# Patient Record
Sex: MALE | Race: WHITE | Employment: UNEMPLOYED | URBAN - METROPOLITAN AREA
[De-identification: names, ages, dates, MRNs, and addresses within clinical notes are randomized per-mention and may not be internally consistent; named-entity substitution may affect disease eponyms.]

---

## 2017-07-19 PROBLEM — K40.90 RIGHT INGUINAL HERNIA: Status: ACTIVE | Noted: 2017-07-19

## 2017-08-30 PROBLEM — T81.41XA POSTOPERATIVE STITCH ABSCESS: Status: ACTIVE | Noted: 2017-08-30

## 2019-10-12 RX ORDER — ALPRAZOLAM 0.5 MG/1
0.5 TABLET ORAL 3 TIMES DAILY PRN
COMMUNITY

## 2019-10-12 RX ORDER — DULOXETIN HYDROCHLORIDE 30 MG/1
30 CAPSULE, DELAYED RELEASE ORAL DAILY
COMMUNITY

## 2020-12-11 ENCOUNTER — HOSPITAL ENCOUNTER (EMERGENCY)
Age: 45
Discharge: HOME | End: 2020-12-11

## 2020-12-11 ENCOUNTER — APPOINTMENT (OUTPATIENT)
Dept: GENERAL RADIOLOGY | Age: 45
End: 2020-12-11

## 2020-12-11 VITALS
SYSTOLIC BLOOD PRESSURE: 138 MMHG | OXYGEN SATURATION: 95 % | HEART RATE: 97 BPM | RESPIRATION RATE: 18 BRPM | TEMPERATURE: 98.1 F | DIASTOLIC BLOOD PRESSURE: 83 MMHG

## 2020-12-11 PROCEDURE — 6370000000 HC RX 637 (ALT 250 FOR IP): Performed by: PHYSICIAN ASSISTANT

## 2020-12-11 PROCEDURE — 73130 X-RAY EXAM OF HAND: CPT

## 2020-12-11 PROCEDURE — 12002 RPR S/N/AX/GEN/TRNK2.6-7.5CM: CPT

## 2020-12-11 PROCEDURE — 6360000002 HC RX W HCPCS: Performed by: PHYSICIAN ASSISTANT

## 2020-12-11 PROCEDURE — 99283 EMERGENCY DEPT VISIT LOW MDM: CPT

## 2020-12-11 PROCEDURE — 90715 TDAP VACCINE 7 YRS/> IM: CPT | Performed by: PHYSICIAN ASSISTANT

## 2020-12-11 PROCEDURE — 2500000003 HC RX 250 WO HCPCS: Performed by: PHYSICIAN ASSISTANT

## 2020-12-11 RX ORDER — HYDROCODONE BITARTRATE AND ACETAMINOPHEN 5; 325 MG/1; MG/1
1 TABLET ORAL EVERY 6 HOURS PRN
Qty: 20 TABLET | Refills: 0 | Status: SHIPPED | OUTPATIENT
Start: 2020-12-11 | End: 2020-12-16

## 2020-12-11 RX ORDER — CEPHALEXIN 500 MG/1
500 CAPSULE ORAL 4 TIMES DAILY
Qty: 40 CAPSULE | Refills: 0 | Status: SHIPPED | OUTPATIENT
Start: 2020-12-11 | End: 2020-12-21

## 2020-12-11 RX ORDER — LIDOCAINE HYDROCHLORIDE 10 MG/ML
10 INJECTION, SOLUTION EPIDURAL; INFILTRATION; INTRACAUDAL; PERINEURAL ONCE
Status: COMPLETED | OUTPATIENT
Start: 2020-12-11 | End: 2020-12-11

## 2020-12-11 RX ORDER — CEPHALEXIN 250 MG/1
500 CAPSULE ORAL ONCE
Status: COMPLETED | OUTPATIENT
Start: 2020-12-11 | End: 2020-12-11

## 2020-12-11 RX ADMIN — CEPHALEXIN 500 MG: 250 CAPSULE ORAL at 20:56

## 2020-12-11 RX ADMIN — LIDOCAINE HYDROCHLORIDE 10 ML: 10 INJECTION, SOLUTION EPIDURAL; INFILTRATION; INTRACAUDAL; PERINEURAL at 20:57

## 2020-12-11 RX ADMIN — TETANUS TOXOID, REDUCED DIPHTHERIA TOXOID AND ACELLULAR PERTUSSIS VACCINE, ADSORBED 0.5 ML: 5; 2.5; 8; 8; 2.5 SUSPENSION INTRAMUSCULAR at 20:57

## 2020-12-11 ASSESSMENT — PAIN SCALES - GENERAL
PAINLEVEL_OUTOF10: 7
PAINLEVEL_OUTOF10: 5

## 2020-12-11 ASSESSMENT — PAIN DESCRIPTION - ORIENTATION: ORIENTATION: LEFT

## 2020-12-11 ASSESSMENT — PAIN DESCRIPTION - LOCATION: LOCATION: HAND

## 2020-12-11 NOTE — ED PROVIDER NOTES
eMERGENCY dEPARTMENT eNCOUnter        [facility]    Chief Complaint   Patient presents with    Hand Injury     cut left 5th finger with chainsaw       HPI    Trudy Lomeli is a 50 y.o. male who presents with a laceration. Onset was prior to arrival.  Context was patient accidentally cut his left 5th finger on a chainsaw. Laceration is localized to the lateral aspect of the proximal phalanx. Patient reports associated tenderness to the site, severity 5/10, as well as some numbness to the lateral distal phalanx. Denies any limitation to ROM of the finger. Patient is not up-to-date on Tetanus. REVIEW OF SYSTEMS    See HPI for further details. Review of systems otherwise negative. Musculoskeletal:  + finger pain  Integument:  + laceration  Neurologic:  + numbness distal finger    PAST MEDICAL HISTORY    Past Medical History:   Diagnosis Date    Chronic back pain     HERNIATED L-4 & L-5       SURGICAL HISTORY    Past Surgical History:   Procedure Laterality Date    ELBOW SURGERY Right     SCOPE    HERNIA REPAIR      KNEE SURGERY      MANISCUS        CURRENT MEDICATIONS    Current Outpatient Rx   Medication Sig Dispense Refill    DULoxetine (CYMBALTA) 30 MG extended release capsule Take 30 mg by mouth daily      ALPRAZolam (XANAX) 0.5 MG tablet Take 0.5 mg by mouth 3 times daily as needed for Sleep.       sulfamethoxazole-trimethoprim (BACTRIM DS;SEPTRA DS) 800-160 MG per tablet TAKE 1 TABLET BY MOUTH TWICE A DAY  0    ibuprofen (ADVIL;MOTRIN) 200 MG tablet Take 200 mg by mouth every 8 hours as needed for Pain (ONLY AS NEEDED)         ALLERGIES    Allergies   Allergen Reactions    Naproxen Sodium Anaphylaxis    Peanut-Containing Drug Products Anaphylaxis       FAMILY HISTORY    Family History   Problem Relation Age of Onset    High Blood Pressure Mother     High Blood Pressure Father     Prostate Cancer Father        SOCIAL HISTORY    Social History     Socioeconomic History    Marital status:      Spouse name: None    Number of children: None    Years of education: None    Highest education level: None   Occupational History    None   Social Needs    Financial resource strain: None    Food insecurity     Worry: None     Inability: None    Transportation needs     Medical: None     Non-medical: None   Tobacco Use    Smoking status: Never Smoker    Smokeless tobacco: Never Used   Substance and Sexual Activity    Alcohol use: Yes     Comment: occassional    Drug use: No    Sexual activity: None   Lifestyle    Physical activity     Days per week: None     Minutes per session: None    Stress: None   Relationships    Social connections     Talks on phone: None     Gets together: None     Attends Buddhism service: None     Active member of club or organization: None     Attends meetings of clubs or organizations: None     Relationship status: None    Intimate partner violence     Fear of current or ex partner: None     Emotionally abused: None     Physically abused: None     Forced sexual activity: None   Other Topics Concern    None   Social History Narrative    None       PHYSICAL EXAM    VITAL SIGNS: /83   Pulse 97   Temp 98.1 °F (36.7 °C) (Oral)   Resp 18   SpO2 95%   Constitutional:  Well developed, well nourished, no acute distress, non-toxic appearance   HENT:  NC/AT. Ears, nose, mouth normal.   Respiratory:  Normal respiratory effort. Musculoskeletal:  No edema, no tenderness, no deformities. Integument:  Approximately 3 cm total length jagged laceration along lateral proximal phalanx of left 5th finger      RADIOLOGY/PROCEDURES    Xr Hand Left (min 3 Views)    Result Date: 12/11/2020  EXAMINATION: THREE XRAY VIEWS OF THE LEFT HAND 12/11/2020 7:18 pm COMPARISON: None.  HISTORY: ORDERING SYSTEM PROVIDED HISTORY: cut 5th finger on chainsaw TECHNOLOGIST PROVIDED HISTORY: Reason for exam:->cut 5th finger on chainsaw Reason for Exam: cut 5th finger on chainsaw Acuity: Acute Type of Exam: Initial FINDINGS: Soft tissue injury overlying 5th proximal phalanx. Fracture of the medial cortex of the 5th proximal phalanx. Anatomic alignment. No other bony abnormality. Fracture of the cortex of the 5th proximal phalanx medially multiple tiny adjacent fragments     Laceration Repair Procedure Note    Indication: Laceration to left 5th finger    Procedure: The patient was placed in the appropriate position, site was prepped with betadine, and anesthesia achieved with 3 cc of 1% lidocaine plain. The area was then cleansed using HibiClens and irrigated with NS. Some superficial tissue was debrided. The laceration was closed using 5 4-0 Prolene simple interrupted sutures. Total repaired wound length: 3 cm. The patient tolerated the procedure well. No immediate complications. ED COURSE & MEDICAL DECISION MAKING    Pertinent Labs & Imaging studies reviewed. (See chart for details)  -  Patient seen and evaluated in the emergency department. -  Triage and nursing notes reviewed and incorporated. -  Old chart records reviewed and incorporated. -  Supervising physician was Dr. Aster Rizvi. Patient was seen independently. -  Differential diagnosis includes:  laceration, compartment syndrome, tendon/neurovascular injury, retained foreign body, and others  -  Work-up included:  XR  -  Patient treated with Tdap, Keflex in the ED. Declined pain medication in the ED.  -  Laceration repair performed. Please see procedure note above. Dressing and finger splint was applied.  -  Patient dc home. Rx Keflex, Norco.  Patient instructed on wound care, including cleaning the area, use of antibiotic ointment, and dressing changes. Follow-up with Ortho Hand in 2-3 days and for suture removal in 7-10 days. Return here as needed. He is agreeable with plan of care and disposition.     In light of current events, I did utilize appropriate PPE (including N95 and surgical face mask, safety glasses, and gloves, as recommended by the health facility/national standard best practice, during my bedside interactions with the patient. FINAL IMPRESSION    1. Open nondisplaced fracture of proximal phalanx of left little finger, initial encounter    2.  Laceration of left little finger without foreign body without damage to nail, initial encounter              Jeannine Rendon PA-C  12/12/20 [id number]

## 2022-11-10 ENCOUNTER — APPOINTMENT (OUTPATIENT)
Dept: ULTRASOUND IMAGING | Age: 47
End: 2022-11-10
Payer: COMMERCIAL

## 2022-11-10 ENCOUNTER — HOSPITAL ENCOUNTER (EMERGENCY)
Age: 47
Discharge: HOME | End: 2022-11-10
Payer: COMMERCIAL

## 2022-11-10 VITALS
WEIGHT: 181 LBS | HEART RATE: 88 BPM | OXYGEN SATURATION: 98 % | DIASTOLIC BLOOD PRESSURE: 72 MMHG | RESPIRATION RATE: 16 BRPM | TEMPERATURE: 98.1 F | HEIGHT: 70 IN | BODY MASS INDEX: 25.91 KG/M2 | SYSTOLIC BLOOD PRESSURE: 130 MMHG

## 2022-11-10 DIAGNOSIS — N45.3 EPIDIDYMO-ORCHITIS: Primary | ICD-10-CM

## 2022-11-10 PROCEDURE — 96374 THER/PROPH/DIAG INJ IV PUSH: CPT

## 2022-11-10 PROCEDURE — 6360000002 HC RX W HCPCS: Performed by: PHYSICIAN ASSISTANT

## 2022-11-10 PROCEDURE — 76870 US EXAM SCROTUM: CPT

## 2022-11-10 PROCEDURE — 99284 EMERGENCY DEPT VISIT MOD MDM: CPT

## 2022-11-10 PROCEDURE — 93975 VASCULAR STUDY: CPT

## 2022-11-10 PROCEDURE — 6370000000 HC RX 637 (ALT 250 FOR IP): Performed by: PHYSICIAN ASSISTANT

## 2022-11-10 RX ORDER — LEVOFLOXACIN 500 MG/1
500 TABLET, FILM COATED ORAL ONCE
Status: COMPLETED | OUTPATIENT
Start: 2022-11-10 | End: 2022-11-10

## 2022-11-10 RX ORDER — MORPHINE SULFATE 4 MG/ML
4 INJECTION, SOLUTION INTRAMUSCULAR; INTRAVENOUS EVERY 30 MIN PRN
Status: DISCONTINUED | OUTPATIENT
Start: 2022-11-10 | End: 2022-11-10

## 2022-11-10 RX ORDER — LEVOFLOXACIN 500 MG/1
500 TABLET, FILM COATED ORAL DAILY
Qty: 10 TABLET | Refills: 0 | Status: SHIPPED | OUTPATIENT
Start: 2022-11-10 | End: 2022-11-20

## 2022-11-10 RX ADMIN — MORPHINE SULFATE 4 MG: 4 INJECTION, SOLUTION INTRAMUSCULAR; INTRAVENOUS at 03:07

## 2022-11-10 RX ADMIN — LEVOFLOXACIN 500 MG: 500 TABLET, FILM COATED ORAL at 04:21

## 2022-11-10 ASSESSMENT — PAIN SCALES - GENERAL
PAINLEVEL_OUTOF10: 10
PAINLEVEL_OUTOF10: 10

## 2022-11-10 ASSESSMENT — PAIN - FUNCTIONAL ASSESSMENT: PAIN_FUNCTIONAL_ASSESSMENT: 0-10

## 2022-11-10 NOTE — ED NOTES
Discharge instructions reviewed with patient and all questions addressed. Patient alert and oriented x4 at time of discharge. Patient ambulatory and steady gait. IV removed and dressing applied.       Marlin Bansal RN  11/10/22 [id number]

## 2025-01-09 ENCOUNTER — HOSPITAL ENCOUNTER (OUTPATIENT)
Dept: PSYCHIATRY | Age: 50
Setting detail: THERAPIES SERIES
Discharge: HOME | End: 2025-01-09

## 2025-01-09 PROCEDURE — 90791 PSYCH DIAGNOSTIC EVALUATION: CPT

## 2025-01-09 PROCEDURE — 80305 DRUG TEST PRSMV DIR OPT OBS: CPT

## 2025-01-09 ASSESSMENT — ANXIETY QUESTIONNAIRES
7. FEELING AFRAID AS IF SOMETHING AWFUL MIGHT HAPPEN: NOT AT ALL
1. FEELING NERVOUS, ANXIOUS, OR ON EDGE: NOT AT ALL
2. NOT BEING ABLE TO STOP OR CONTROL WORRYING: SEVERAL DAYS
5. BEING SO RESTLESS THAT IT IS HARD TO SIT STILL: NOT AT ALL
IF YOU CHECKED OFF ANY PROBLEMS ON THIS QUESTIONNAIRE, HOW DIFFICULT HAVE THESE PROBLEMS MADE IT FOR YOU TO DO YOUR WORK, TAKE CARE OF THINGS AT HOME, OR GET ALONG WITH OTHER PEOPLE: NOT DIFFICULT AT ALL
GAD7 TOTAL SCORE: 2
4. TROUBLE RELAXING: NOT AT ALL
6. BECOMING EASILY ANNOYED OR IRRITABLE: NOT AT ALL
3. WORRYING TOO MUCH ABOUT DIFFERENT THINGS: SEVERAL DAYS

## 2025-01-09 ASSESSMENT — PATIENT HEALTH QUESTIONNAIRE - PHQ9
SUM OF ALL RESPONSES TO PHQ9 QUESTIONS 1 & 2: 2
SUM OF ALL RESPONSES TO PHQ QUESTIONS 1-9: 2
2. FEELING DOWN, DEPRESSED OR HOPELESS: SEVERAL DAYS
SUM OF ALL RESPONSES TO PHQ QUESTIONS 1-9: 2
1. LITTLE INTEREST OR PLEASURE IN DOING THINGS: SEVERAL DAYS
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
## 2025-01-16 ENCOUNTER — HOSPITAL ENCOUNTER (OUTPATIENT)
Dept: PSYCHIATRY | Age: 50
Setting detail: THERAPIES SERIES
Discharge: HOME | End: 2025-01-16

## 2025-01-16 PROCEDURE — 90834 PSYTX W PT 45 MINUTES: CPT

## 2025-01-16 NOTE — PROGRESS NOTES
[facility] TREATMENT PLAN           Location: [x] [place] [] [place]           Treatment plan: Initial          Strengths: mother supportive, seeking employment           Weakness/Limitations: legal stressors, grief and loss, loss of job            Service/Frequency/Duration: Individual Session x 1 time weekly x 90 days,  SOP group x 1 time weekly,x 90 days: Urinalysis one time monthly x 90 days.             Diagnosis:         F10.20 Alcohol Use Disorder      F12.20 Cannabis Use Disorder                Level of Care:  Level I: Individual            Problem: History of Substance use            Goal: Enhance personalized knowledge and insight associated with mood altering substances x 90 days           Objectives:        1) Remind 2 to 6  detrimental consequences in major life areas regarding substance  use in 90 days Evaluation Date: 03/16/25;  Code: C Continue TBD              2) Identify 4 to 8 psychological or physiological benefits /  gratitude’s due to remaining substance free in 90 days: Evaluation Date:   03/16/25 Code: C Continue TBD               3) Identify and explain 2 to 4 explanations about relapse triggers. Explain 2 to 4 things about relapse. Identify 2 to 4 differences and similarities between internal and eternal triggers associated with substance use in 90 days and Evaluation Date:      03/12/25: C ode: Continue TBD              2.    Problem: Limited knowledge regarding disease concept and substance use.            Goal:  Enhance knowledge and understanding regarding disease concept associated with substance use.         Objectives:           1) Complete 2 to 4 assignments regarding biography of substance use, include onset, frequency, tolerance and amounts  and  in 90 days:  Evaluation Date: 03/16/25   Code: C Continue TBD                 2) Complete assignment on difference  between substance abuse, substance dependence and disease concept of substance use in 90 days  Evaluation Date:
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
## 2025-01-21 ENCOUNTER — HOSPITAL ENCOUNTER (OUTPATIENT)
Dept: PSYCHIATRY | Age: 50
Setting detail: THERAPIES SERIES
Discharge: HOME | End: 2025-01-21

## 2025-01-21 PROCEDURE — 90853 GROUP PSYCHOTHERAPY: CPT

## 2025-01-21 PROCEDURE — 90832 PSYTX W PT 30 MINUTES: CPT

## 2025-01-22 NOTE — PROGRESS NOTES
[facility]REAT[facility]AN           Location: [x] [place] [] [place]           Treatment plan: Initial          Strengths: mother supportive, seeking employment           Weakness/Limitations: legal stressors, grief and loss, loss of job            Service/Frequency/Duration: Individual Session x 1 time weekly x 90 days,  SOP group x 1 time weekly,x 90 days: Urinalysis one time monthly x 90 days.             Diagnosis:         F10.20 Alcohol Use Disorder      F12.20 Cannabis Use Disorder                Level of Care:  Level I: Individual            Problem: History of Substance use            Goal: Enhance personalized knowledge and insight associated with mood altering substances x 90 days           Objectives:        1) Remind 2 to 6  detrimental consequences in major life areas regarding substance  use in 90 days Evaluation Date: 03/16/25;  Code: C Continue TBD              2) Identify 4 to 8 psychological or physiological benefits /  gratitude’s due to remaining substance free in 90 days: Evaluation Date:   03/16/25 Code: C Continue TBD               3) Identify and explain 2 to 4 explanations about relapse triggers. Explain 2 to 4 things about relapse. Identify 2 to 4 differences and similarities between internal and eternal triggers associated with substance use in 90 days and Evaluation Date:      03/12/25: CHUCK ode:       1-21-25: Moiz  attended session, identified still struggling with loss of father and dog: able to connect his progression of drinking to his loss: therapist reviewed stages of grief and loss.               2.    Problem: Limited knowledge regarding disease concept and substance use.            Goal:  Enhance knowledge and understanding regarding disease concept associated with substance use.         Objectives:           1) Complete 2 to 4 assignments regarding biography of substance use, include onset, frequency, tolerance and amounts  and  in 90 days:  Evaluation Date: 03/16/25
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
## 2025-01-22 NOTE — GROUP NOTE
[facility] Group Therapy Note      1/21/2025    Location:  [place]      Clients Presents: 1043, 1729, 1744    Clients Absent: 1710, 1716, 1720, 1610    Length of session: 1.5 hours    Group Note: OP    Group Type: Co-Ed    New members were welcomed and introduced.  Norms and expectations of group were discussed.    Content: Counselor presented a topic focused discussion on AoD autobiographies, Client wrote a short 5 minute autobiography and shared with group members.     ENZO Delarosa  1/21/2025 7:02 PM    Co-Therapist: N/A      [facility] Individual Group Progress Note    Moiz Mooney  1975 1/22/2025    Notes on Client Progress in Group    Client shared he feels like he is making progress on his goals. He denies any use or cravings.   Client wrote an autobiography of substance use and shared with the group. Client gave appropriate feedback and support.      ENZO Delarosa  1/22/2025 7:29 AM    Co-Therapist: N/A

## 2025-01-23 ENCOUNTER — APPOINTMENT (OUTPATIENT)
Dept: PSYCHIATRY | Age: 50
End: 2025-01-23

## 2025-01-28 ENCOUNTER — HOSPITAL ENCOUNTER (OUTPATIENT)
Dept: PSYCHIATRY | Age: 50
Setting detail: THERAPIES SERIES
Discharge: HOME | End: 2025-01-28

## 2025-01-28 PROCEDURE — 90832 PSYTX W PT 30 MINUTES: CPT

## 2025-01-28 PROCEDURE — 90853 GROUP PSYCHOTHERAPY: CPT

## 2025-01-29 NOTE — GROUP NOTE
[facility] Group Therapy Note      1/28/2025    Location:  [place]      Clients Presents: 1729, 1744, 1751, 1610, 1716    Clients Absent: 1043, 1720    Length of session: 1.5 hours    Group Note: OP    Group Type: Co-Ed    New members were welcomed and introduced.  Norms and expectations of group were discussed.    Content: Counselor presented a solution focused discussion on triggers. Client identified internal and external triggers and coping skill to avoid relapse.     ENZO Delarosa  1/28/2025 7:00 PM    Co-Therapist: N/A      [facility] Individual Group Progress Note    Moiz Mooney  1975 1/29/2025    Notes on Client Progress in Group    Client shared he is making progress on his goals. He denies any use or cravings. Denies any current stressors.   Client participated in group discussion on triggers. He reports his biggest trigger is grief and loss. He lost his dog and his father close together.     CAMI DelarosaIII  1/29/2025 7:42 AM    Co-Therapist: N/A

## 2025-01-30 ENCOUNTER — APPOINTMENT (OUTPATIENT)
Dept: PSYCHIATRY | Age: 50
End: 2025-01-30

## 2025-02-04 ENCOUNTER — HOSPITAL ENCOUNTER (OUTPATIENT)
Dept: PSYCHIATRY | Age: 50
Setting detail: THERAPIES SERIES
Discharge: HOME | End: 2025-02-04

## 2025-02-04 NOTE — PROGRESS NOTES
[facility]        Individual  Progress Note    Location: [x] [place] [] [place]                   Patient Name: Moiz Mooney   : 1975     Case # :  [id number]  Therapist: AARON Strickland        Cancel: illness         Electronically signed by AARON Strickland on 2025 at 5:47 PM         JULIAN Hi, LSW, AARON

## 2025-02-05 NOTE — GROUP NOTE
[facility] Group Therapy Note      2/4/2025    Location:  [place]      Clients Presents: 1729, 1720, 1610    Clients Absent: 1744, 1716, 1751    Length of session: 1.5 hours    Group Note: OP    Group Type: Co-Ed    New members were welcomed and introduced.  Norms and expectations of group were discussed.    Content: Counselor facilitated an open discussion on stress, emotions and coping skills to avoid relapse.     ENZO Delarosa  2/4/2025 7:00 PM    Co-Therapist: N/A      [facility] Individual Group Progress Note    Moiz Mooney  1975 2/5/2025    Notes on Client Progress in Group    Reason for Absence: cancel sick    ENZO Delarosa  2/5/2025 7:40 AM    Co-Therapist: N/A

## 2025-02-06 ENCOUNTER — APPOINTMENT (OUTPATIENT)
Dept: PSYCHIATRY | Age: 50
End: 2025-02-06

## 2025-02-11 ENCOUNTER — HOSPITAL ENCOUNTER (OUTPATIENT)
Dept: PSYCHIATRY | Age: 50
Setting detail: THERAPIES SERIES
Discharge: HOME | End: 2025-02-11
Payer: COMMERCIAL

## 2025-02-11 PROCEDURE — 90832 PSYTX W PT 30 MINUTES: CPT

## 2025-02-12 NOTE — GROUP NOTE
[facility] Group Therapy Note      2/11/2025    Location:  [place]      Clients Presents: 1762, 1407, 1729, 1720, 1610, 1538    Clients Absent: 1751, 1749, 1744    Length of session: 1.5 hours    Group Note: OP    Group Type: Co-Ed    New members were welcomed and introduced.  Norms and expectations of group were discussed.    Content: Counselor facilitated a topic focused discussion on family backgrounds.     ENZO Delarosa  2/11/2025 7:00 PM    Co-Therapist: N/A      [facility] Individual Group Progress Note    Moiz Mooney  1975 2/12/2025    Notes on Client Progress in Group    Reason for Absence: cancel    ENZO Delarosa  2/12/2025 8:59 AM    Co-Therapist: N/A

## 2025-02-13 ENCOUNTER — APPOINTMENT (OUTPATIENT)
Dept: PSYCHIATRY | Age: 50
End: 2025-02-13

## 2025-02-13 NOTE — PROGRESS NOTES
[facility] TREATMENT PLAN           Location: [x] [place] [] [place]           Treatment plan: Initial          Strengths: mother supportive, seeking employment           Weakness/Limitations: legal stressors, grief and loss, loss of job            Service/Frequency/Duration: Individual Session x 1 time weekly x 90 days,  SOP group x 1 time weekly,x 90 days: Urinalysis one time monthly x 90 days.             Diagnosis:         F10.20 Alcohol Use Disorder      F12.20 Cannabis Use Disorder                Level of Care:  Level I: Individual            Problem: History of Substance use            Goal: Enhance personalized knowledge and insight associated with mood altering substances x 90 days           Objectives:        1) Remind 2 to 6  detrimental consequences in major life areas regarding substance  use in 90 days Evaluation Date: 03/16/25;  Code: C Continue TBD                2) Identify 4 to 8 psychological or physiological benefits /  gratitude’s due to remaining substance free in 90 days: Evaluation Date:   03/16/25 Code: C Continue TBD               3) Identify and explain 2 to 4 explanations about relapse triggers. Explain 2 to 4 things about relapse. Identify 2 to 4 differences and similarities between internal and eternal triggers associated with substance use in 90 days and Evaluation Date:      03/12/25: CHUCK ode:       1-21-25: Moiz  attended session, identified still struggling with loss of father and dog: able to connect his progression of drinking to his loss: therapist reviewed stages of grief and loss.              Tele health: 2-11-25: Moiz indicated his dogs \" got out and possibly killed the cat\": he was tearful, still unemployed, reported incident reminded him of other losses in his life, discussed his struggle of not wanting any more losses, denied drinking.            2.    Problem: Limited knowledge regarding disease concept and substance use.            Goal:  Enhance 
into an appointment for the relevant concern    Moiz Mooney is a 49 y.o. male evaluated via telephone on 2/11/2025 for No chief complaint on file.  .        Bebeto Golden, [facility]-CS

## 2025-02-18 ENCOUNTER — HOSPITAL ENCOUNTER (OUTPATIENT)
Dept: PSYCHIATRY | Age: 50
Setting detail: THERAPIES SERIES
Discharge: HOME | End: 2025-02-18
Payer: COMMERCIAL

## 2025-02-18 PROCEDURE — 90832 PSYTX W PT 30 MINUTES: CPT

## 2025-02-18 PROCEDURE — 80305 DRUG TEST PRSMV DIR OPT OBS: CPT

## 2025-02-18 PROCEDURE — 90853 GROUP PSYCHOTHERAPY: CPT
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
## 2025-02-20 ENCOUNTER — APPOINTMENT (OUTPATIENT)
Dept: PSYCHIATRY | Age: 50
End: 2025-02-20

## 2025-02-25 ENCOUNTER — HOSPITAL ENCOUNTER (OUTPATIENT)
Dept: PSYCHIATRY | Age: 50
Setting detail: THERAPIES SERIES
Discharge: HOME | End: 2025-02-25
Payer: COMMERCIAL

## 2025-02-25 PROCEDURE — 90853 GROUP PSYCHOTHERAPY: CPT

## 2025-02-25 PROCEDURE — 90832 PSYTX W PT 30 MINUTES: CPT
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
## 2025-02-27 ENCOUNTER — APPOINTMENT (OUTPATIENT)
Dept: PSYCHIATRY | Age: 50
End: 2025-02-27
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
## 2025-02-27 NOTE — PROGRESS NOTES
[facility] TREATMENT PLAN           Location: [x] [place] [] [place]           Treatment plan: Initial          Strengths: mother supportive, seeking employment           Weakness/Limitations: legal stressors, grief and loss, loss of job            Service/Frequency/Duration: Individual Session x 1 time weekly x 90 days,  SOP group x 1 time weekly,x 90 days: Urinalysis one time monthly x 90 days.             Diagnosis:         F10.20 Alcohol Use Disorder      F12.20 Cannabis Use Disorder                Level of Care:  Level I: Individual            Problem: History of Substance use            Goal: Enhance personalized knowledge and insight associated with mood altering substances x 90 days           Objectives:        1) Remind 2 to 6  detrimental consequences in major life areas regarding substance  use in 90 days Evaluation Date: 03/16/25;  Code:           On 2-18-25: Moiz attended session, admitted missing session last week due to dogs killing hi cat: recalled his loss of cat reminded him his loss of favorite dog: admitted he struggles with significant losses: including his job, and father.                  2) Identify 4 to 8 psychological or physiological benefits /  gratitude’s due to remaining substance free in 90 days: Evaluation Date:   03/16/25 Code: C Continue TBD               3) Identify and explain 2 to 4 explanations about relapse triggers. Explain 2 to 4 things about relapse. Identify 2 to 4 differences and similarities between internal and eternal triggers associated with substance use in 90 days and Evaluation Date:      03/12/25: C ode:       1-21-25: Moiz  attended session, identified still struggling with loss of father and dog: able to connect his progression of drinking to his loss: therapist reviewed stages of grief and loss.              [facility] health: 2-11-25: Moiz indicated his dogs \" got out and possibly killed the cat\": he was tearful, still unemployed, reported

## 2025-03-04 ENCOUNTER — HOSPITAL ENCOUNTER (OUTPATIENT)
Dept: PSYCHIATRY | Age: 50
Setting detail: THERAPIES SERIES
Discharge: HOME | End: 2025-03-04
Payer: COMMERCIAL

## 2025-03-04 PROCEDURE — 80305 DRUG TEST PRSMV DIR OPT OBS: CPT

## 2025-03-04 PROCEDURE — 90834 PSYTX W PT 45 MINUTES: CPT

## 2025-03-04 PROCEDURE — 90853 GROUP PSYCHOTHERAPY: CPT

## 2025-03-04 NOTE — PROGRESS NOTES
cat and another dog: indicated being behind on mortgage severely and considering selling house: believes it would reduce his stress level: denies any substance use.               O: Denies any homicidal and suicidal ideation: denies any psychosis, oriented x 4              A: reviewing stages loss, healthy decision making.              P: Plan continue services         Electronically signed by PENNIE Strickland-TELLO on 3/4/2025 at 5:59 PM         Bebeto Golden, [facility], LSW, [facility]-CS  
Code: C Continue TBD                  2) Enhance 4 to 8 healthy techniques and coping skills to empower a healthy  relapse prevention plan x 90 days  Evaluation Date: 03/16/25   Code: C Continue TB               3) Journal, discuss, monitor  3 to 5 physiological, psychological, biological and mental alterations and coping skills of being sober: x 90 days  Evaluation Date:  03/16/25 Code: Code: C Continue TBD                  Defer: Address legal stipulations                  Discharge Plan/Instructions: Demonstrate constructive motivation to successfully complete outpatient treatment and develop healthy sobriety plan.         Moiz Mooney / 1975 has participated in the treatment plan development outlined above on 3/4/2025.     Bebeto Golden, [facility]-  3/4/2025/5:53 PM
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
## 2025-03-06 ENCOUNTER — APPOINTMENT (OUTPATIENT)
Dept: PSYCHIATRY | Age: 50
End: 2025-03-06
Payer: COMMERCIAL

## 2025-03-11 ENCOUNTER — HOSPITAL ENCOUNTER (OUTPATIENT)
Dept: PSYCHIATRY | Age: 50
Setting detail: THERAPIES SERIES
Discharge: HOME | End: 2025-03-11
Payer: COMMERCIAL

## 2025-03-11 PROCEDURE — 90832 PSYTX W PT 30 MINUTES: CPT

## 2025-03-11 PROCEDURE — 90853 GROUP PSYCHOTHERAPY: CPT

## 2025-03-12 NOTE — GROUP NOTE
[facility] Group Therapy Note      3/12/2025    Location:  [place]      Clients Presents: 1744, 1720, 1762, 1769, 1778, 1777    Clients Absent: 1751, 1749, 1538, 1761, 1407    Length of session: 1.5 hours    Group Note: OP    Group Type: Co-Ed    New members were welcomed and introduced.  Norms and expectations of group were discussed.    Content: Counselor presented a solution focused discussion on identifying triggers. Client identified internal and external triggers and coping skills to avoid relapse.     ENZO Delarosa  3/12/2025 7:35 AM    Co-Therapist: N/A      [facility] Group Progress Note    Moiz Mooney  1975  3/12/2025    Notes on Client Progress in Group    Client shared he is making progress on his goals in treatment. He reports the loss of animals and his father are stressors.   Client participated in group discussion on triggers. He shared grief and loss as his biggest triggers.     ENZO Delarosa  3/12/2025 7:43 AM    Co-Therapist: N/A

## 2025-03-12 NOTE — PROGRESS NOTES
loss of two jobs: expressed loss of energy and effort to repair house: view residing his house as trigger.               O: Denies any homicidal and suicidal ideation: denies any psychosis, oriented x 4              A: reviewed understanding of grief process and internal triggers               P: continue services           Electronically signed by Bebeto Golden [facility] on 3/12/2025 at 9:24 AM         Bebeto Golden, [facility], LSW, [facility]-CS  
dynamics and dilemmas that are perpetuating consequences and exacerbate relapses and triggers and in 90 days        Objectives:           1)  Complete and review with therapist at least 2 or more periods of being sober in 90 days Evaluation Date: 03/16/25  Code: C Continue TBD                  2) Enhance 4 to 8 healthy techniques and coping skills to empower a healthy  relapse prevention plan x 90 days  Evaluation Date: 03/16/25   Code: C Continue TB               3) Journal, discuss, monitor  3 to 5 physiological, psychological, biological and mental alterations and coping skills of being sober: x 90 days  Evaluation Date:  03/16/25 Code: Code: C Continue TBD                  Defer: Address legal stipulations                  Discharge Plan/Instructions: Demonstrate constructive motivation to successfully complete outpatient treatment and develop healthy sobriety plan.         Moiz Mooney / 1975 has participated in the treatment plan development outlined above on 3/12/2025.     Bebeto Golden, [facility]-  3/12/2025/9:14 AM

## 2025-03-13 ENCOUNTER — APPOINTMENT (OUTPATIENT)
Dept: PSYCHIATRY | Age: 50
End: 2025-03-13
Payer: COMMERCIAL

## 2025-03-18 ENCOUNTER — HOSPITAL ENCOUNTER (OUTPATIENT)
Dept: PSYCHIATRY | Age: 50
Setting detail: THERAPIES SERIES
Discharge: HOME | End: 2025-03-18

## 2025-03-19 NOTE — GROUP NOTE
[facility] Group Therapy Note      3/19/2025    Location:  [place]      Clients Presents: 1761, 1407, 1749, 1769, 1777, 1765    Clients Absent: 1538, 1778, 1774, 1762, 1744    Length of session: 1.5 hours    Group Note: OP    Group Type: Co-Ed    New members were welcomed and introduced.  Norms and expectations of group were discussed.    Content: Counselor presented a topic focused discussion on \"thinking errors\". Client identified his/her thinking errors.     ENZO Delarosa  3/19/2025 7:00 PM    Co-Therapist: N/A      [facility] Individual Group Progress Note    Moiz Mooney  1975  3/19/2025    Notes on Client Progress in Group    Reason for Absence: DNS     ENZO Delarosa  3/19/2025 7:44 AM    Co-Therapist: N/A

## 2025-03-19 NOTE — PROGRESS NOTES
Lazara ALCANTARA        Individual  Progress Note    Location: [x] [place] [] [place]                   Patient Name: Moiz Mooney   : 1975     Case # :  [id number]  Therapist: AARON Strickland          Did not show           Electronically signed by AARON Strickland on 3/19/2025 at 5:09 PM           JULIAN Hi, BRYANNA, AARON

## 2025-03-25 ENCOUNTER — HOSPITAL ENCOUNTER (OUTPATIENT)
Dept: PSYCHIATRY | Age: 50
Setting detail: THERAPIES SERIES
Discharge: HOME | End: 2025-03-25

## 2025-03-25 PROCEDURE — 90853 GROUP PSYCHOTHERAPY: CPT

## 2025-03-25 PROCEDURE — 90832 PSYTX W PT 30 MINUTES: CPT

## 2025-03-26 NOTE — GROUP NOTE
[facility] Group Therapy Note      3/26/2025    Location:  [place]      Clients Presents: 1744, 1762, 1774, 1769, 1778, 1777    Clients Absent: 1749, 1538, 1765, 1761, 1407    Length of session: 1.5 hours    Group Note: OP    Group Type: Co-Ed    New members were welcomed and introduced.  Norms and expectations of group were discussed.    Content: Counselor presented a solution focused discussion on \"anger\". Client identified his/her anger style. Client participated in playing \"Anger BINGO\".     ENZO Delarosa  3/26/2025 7:00 PM    Co-Therapist: N/A      [facility] Individual Group Progress Note    Moiz Mooney  1975  3/26/2025    Notes on Client Progress in Group    Client shared he is making progress on his treatment goals. Client denies any use or cravings. Client reports probation a big stressor.   Client participated in group discussion on anger. He shared his anger style is explosive but has gotten better as he gets older.     ENZO Delarosa  3/26/2025 7:45 AM    Co-Therapist: N/A
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
## 2025-03-26 NOTE — PROGRESS NOTES
[facility] TREATMENT PLAN           Location: [x] [place] [] [place]           Treatment plan: Initial          Strengths: mother supportive, seeking employment           Weakness/Limitations: legal stressors, grief and loss, loss of job            Service/Frequency/Duration: Individual Session x 1 time weekly x 90 days,  SOP group x 1 time weekly,x 90 days: Urinalysis one time monthly x 90 days.             Diagnosis:         F10.20 Alcohol Use Disorder      F12.20 Cannabis Use Disorder                Level of Care:  Level I: Individual            Problem: History of Substance use            Goal: Enhance personalized knowledge and insight associated with mood altering substances x 90 days           Objectives:        1) Remind 2 to 6  detrimental consequences in major life areas regarding substance  use in 90 days Evaluation Date: 03/16/25;  Code:           On 2-18-25: Moiz attended session, admitted missing session last week due to dogs killing hi cat: recalled his loss of cat reminded him his loss of favorite dog: admitted he struggles with significant losses: including his job, and father.                  2) Identify 4 to 8 psychological or physiological benefits /  gratitude’s due to remaining substance free in 90 days: Evaluation Date:   03/16/25 Code: C Continue TBD               3) Identify and explain 2 to 4 explanations about relapse triggers. Explain 2 to 4 things about relapse. Identify 2 to 4 differences and similarities between internal and eternal triggers associated with substance use in 90 days and Evaluation Date:      03/12/25: C ode:       1-21-25: Moiz  attended session, identified still struggling with loss of father and dog: able to connect his progression of drinking to his loss: therapist reviewed stages of grief and loss.              [facility] health: 2-11-25: Moiz indicated his dogs \" got out and possibly killed the cat\": he was tearful, still unemployed, reported  BMI: BMI (kg/m2): 35.2 (12-06-23 @ 08:57)  HbA1c: A1C with Estimated Average Glucose Result: 5.3 % (09-29-23 @ 10:00)    Glucose:   BP: --Vital Signs Last 24 Hrs  T(C): --  T(F): --  HR: --  BP: --  BP(mean): --  RR: --  SpO2: --      Lipid Panel: Date/Time: 09-29-23 @ 10:00  Cholesterol, Serum: 166  LDL Cholesterol Calculated: 94  HDL Cholesterol, Serum: 60  Total Cholesterol/HDL Ration Measurement: --  Triglycerides, Serum: 61

## 2025-04-01 ENCOUNTER — HOSPITAL ENCOUNTER (OUTPATIENT)
Dept: PSYCHIATRY | Age: 50
Setting detail: THERAPIES SERIES
Discharge: HOME | End: 2025-04-01

## 2025-04-01 PROCEDURE — 90832 PSYTX W PT 30 MINUTES: CPT

## 2025-04-01 PROCEDURE — 90853 GROUP PSYCHOTHERAPY: CPT

## 2025-04-02 NOTE — PROGRESS NOTES
Documentation:  I communicated with the patient and/or health care decision maker about       Reviewed with client to state their name and the last 4 numbers of his/her SS#   Reviewed with client if they are in a confidential location where other people cannot hear the content of the counseling session, discuss the importance of confidentiality.   Asked client's permission to conduct treatment individual counseling session via telehealth.   Let the client know if you are disconnected that each one should try to call back until you make contact again.   Receive the crisis hotline phone numbers; [phone].         Details of this discussion including any medical advice provided:       Tele health: 1630 until 1700: 25: Moiz expressed he continues to pack, selling items and prepare items: still plans to sell home, encouraged to finalize sell: he discussed his home as trigger due to multiple losses in life: cat , 2 dogs, and divorce remind him of bad memories        Total Time: minutes: 21-30 minutes      Moiz Mooney was evaluated through a synchronous (real-time) audio encounter. Patient identification was verified at the start of the visit. He (or guardian if applicable) is aware that this is a billable service, which includes applicable co-pays. This visit was conducted with the patient's (and/or legal guardian's) verbal consent. He has not had a related appointment within my department in the past 7 days or scheduled within the next 24 hours.   The patient was located at Home: [address] [place] [address]   [facility] [id number].        The provider was located at Facility (Appt Dept): [place] [address].        Confirm you are appropriately licensed, registered, or certified to deliver care in the state where the patient is located as indicated above. If you are not or unsure, please re-schedule the visit: Yes, I confirm.     Note: not billable if this call serves to

## 2025-04-02 NOTE — PROGRESS NOTES
[facility]REATMENT [facility]AN           Location: [x] [place] [] [place]           Treatment plan: Initial          Strengths: mother supportive, seeking employment           Weakness/Limitations: legal stressors, grief and loss, loss of job            Service/Frequency/Duration: Individual Session x 1 time weekly x 90 days,  SOP group x 1 time weekly,x 90 days: Urinalysis one time monthly x 90 days.             Diagnosis:         F10.20 Alcohol Use Disorder      F12.20 Cannabis Use Disorder                Level of Care:  Level I: Individual            Problem: History of Substance use            Goal: Enhance personalized knowledge and insight associated with mood altering substances x 90 days           Objectives:        1) Remind 2 to 6  detrimental consequences in major life areas regarding substance  use in 90 days Evaluation Date: 03/16/25;  Code:           On 2-18-25: Moiz attended session, admitted missing session last week due to dogs killing hi cat: recalled his loss of cat reminded him his loss of favorite dog: admitted he struggles with significant losses: including his job, and father.                  2) Identify 4 to 8 psychological or physiological benefits /  gratitude’s due to remaining substance free in 90 days: Evaluation Date:   03/16/25 Code: C Continue TBD               3) Identify and explain 2 to 4 explanations about relapse triggers. Explain 2 to 4 things about relapse. Identify 2 to 4 differences and similarities between internal and eternal triggers associated with substance use in 90 days and Evaluation Date:      03/12/25: C ode:       1-21-25: Moiz  attended session, identified still struggling with loss of father and dog: able to connect his progression of drinking to his loss: therapist reviewed stages of grief and loss.              [facility] health: 2-11-25: Moiz indicated his dogs \" got out and possibly killed the cat\": he was tearful, still unemployed, reported

## 2025-04-02 NOTE — GROUP NOTE
[facility] Group Therapy Note      4/2/2025    Location:  [place]      Clients Presents: [id number], 1769, 1744, 1749, 1774, 1778, 1777, 1765    Clients Absent: 1762, 1407    Length of session: 1.5 hours    Group Note: OP    Group Type: Co-Ed    New members were welcomed and introduced.  Norms and expectations of group were discussed.    Content: Counselor presented a topic focused discussion on masks. Client identified mask he/she wears and ways he/she could practice being more authentic.     ENZO Delarosa  4/2/2025 7:26 AM    Co-Therapist: N/A      [facility] Individual Group Progress Note    Moiz Mooney  1975 4/2/2025    Notes on Client Progress in Group    Client shared he is making progress on his goals and denies any use or cravings.   Client participated in group discussion on masks. He shared he uses the mask of avoidence and the mask of blindness.      ENZO Delarosa  4/2/2025 7:37 AM    Co-Therapist: N/A

## 2025-04-08 ENCOUNTER — HOSPITAL ENCOUNTER (OUTPATIENT)
Dept: PSYCHIATRY | Age: 50
Setting detail: THERAPIES SERIES
Discharge: HOME | End: 2025-04-08

## 2025-04-08 PROCEDURE — 90832 PSYTX W PT 30 MINUTES: CPT

## 2025-04-08 PROCEDURE — 80305 DRUG TEST PRSMV DIR OPT OBS: CPT

## 2025-04-08 PROCEDURE — 90853 GROUP PSYCHOTHERAPY: CPT

## 2025-04-09 NOTE — PROGRESS NOTES
identified still struggling with loss of father and dog: able to connect his progression of drinking to his loss: therapist reviewed stages of grief and loss.              Tele health: 25: Moiz indicated his dogs \" got out and possibly killed the cat\": he was tearful, still unemployed, reported incident reminded him of other losses in his life, discussed his struggle of not wanting any more losses, denied drinking.         25: Moiz arrived indicated still unemployed, identify  anger and guilt shame stages of grief associated with prior and current losses.           3-4-25: Moiz arrived, tearful, expressed another loss of dog: in other words, loss of father, dog cat and another dog: indicated being behind on mortgage severely and considering selling house: believes it would reduce his stress level: denies any substance use.         3-11-25: Moiz arrived indicated he has been sober, has decided plans to sell his house due to bad memories': divorce, loss of father and loss of two jobs: expressed loss of energy and effort to repair house: view residing his house as trigger.           3-25-25: Moiz arrived, expressed still struggling with grief and loss, denies drinking, admitted he was released from half-way, tends to vent but not follow through with finalizing selling home, plans to reside with mother, expressed since his divorce being depressed.         Tele health: 1630 until 1700: 25: Moiz expressed he continues to pack, selling items and prepare items: still plans to sell home, encouraged to finalize sell: he discussed his home as trigger due to multiple losses in life: cat , 2 dogs, and divorce remind him of bad memories          2.    Problem: Limited knowledge regarding disease concept and substance use.            Goal:  Enhance knowledge and understanding regarding disease concept associated with substance use.         Objectives:           1) Complete 2

## 2025-04-09 NOTE — GROUP NOTE
[facility] Note      4/9/2025    Location:  [place]      Clients Presents: 1744, 1774, 1775, 8121, 1769, 1765, 1749, 1407, 1777    Clients Absent: 1778, 1762    Length of session: 1.5 hours    Group Note: OP    Group Type: Co-Ed    New members were welcomed and introduced.  Norms and expectations of group were discussed.    Content: Counselor presented a solution focused discussion on stress and relapse prevention.     ENZO Delarosa  4/9/2025 7:00 PM    Co-Therapist: N/A      [facility] Individual Group Progress Note    Moiz Mooney  1975 4/9/2025    Notes on Client Progress in Group    Client shared he is making progress on his goals. He denies any use or cravings.   Client participated in group discussion on stress. He reports since he has made the decision to move out of his home he feels less stress.     ENZO Delarosa  4/9/2025 7:34 AM    Co-Therapist: N/A

## 2025-04-09 NOTE — PROGRESS NOTES
he still struggles with detaching from mothers assistance and procrastinating due to not another month has arrived and he has not finalized selling his home, still grieving.                 O: Denies any homicidal and suicidal ideation: denies any psychosis, oriented x 4              A: collected urine screen,  addressed stages of grief and importance of completing task to assist with healing.                  P: continue services         Electronically signed by PENNIE Strickland-TELLO on 4/9/2025 at 1:48 PM         Bebeto Golden, [facility], LSW, LICDC-CS

## 2025-04-15 ENCOUNTER — HOSPITAL ENCOUNTER (OUTPATIENT)
Dept: PSYCHIATRY | Age: 50
Setting detail: THERAPIES SERIES
Discharge: HOME | End: 2025-04-15

## 2025-04-15 PROCEDURE — 90853 GROUP PSYCHOTHERAPY: CPT

## 2025-04-15 PROCEDURE — 80305 DRUG TEST PRSMV DIR OPT OBS: CPT

## 2025-04-15 PROCEDURE — 90832 PSYTX W PT 30 MINUTES: CPT
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
## 2025-04-16 NOTE — GROUP NOTE
[facility] Therapy Note      4/16/2025    Location:  [place]      Clients Presents: 8121, 1774, 1744, 1769, 1778    Clients Absent: 1765, 1777, 1775, 1749, 1407    Length of session: 1.5 hours    Group Note: OP    Group Type: Co-Ed    New members were welcomed and introduced.  Norms and expectations of group were discussed.    Content: Counselor presented a solution focused discussion on Anxiety and depression. Client identified any anxiety or depression symptoms and coping skills he/she could try to avoid relapse.     ENZO Delarosa  4/16/2025 7:00 PM    Co-Therapist: N/A      [facility] Individual Group Progress Note    Moiz Mooney  1975 4/16/2025    Notes on Client Progress in Group    Client shared he relapsed over the weekend smoking crack. He shared he struggles with severe depression that debilitates him. He reports some days he can't get out of bed.   Client participated in group discussion on anxiety and depression. He shared grief and loss of his father and dogs as the source of his depression.     ENZO Delarosa  4/16/2025 7:45 AM    Co-Therapist: N/A

## 2025-04-22 ENCOUNTER — HOSPITAL ENCOUNTER (OUTPATIENT)
Dept: PSYCHIATRY | Age: 50
Setting detail: THERAPIES SERIES
Discharge: HOME | End: 2025-04-22

## 2025-04-23 NOTE — GROUP NOTE
[facility] Note      4/23/2025    Location:  [place]      Clients Presents: 8121, 1792, 1749, 1775, 1774, 1407, 1769, 1778, 1777    Clients Absent: 1744, 1765    Length of session: 1.5 hours    Group Note: OP    Group Type: Co-Ed    New members were welcomed and introduced.  Norms and expectations of group were discussed.    Content: Counselor presented a topic focused discussion on \"Reservations\". Client identified reservations he/she has and ways to cancel the reservation.     ENZO Delarosa  4/23/2025 7:00 PM    Co-Therapist: N/A      [facility] Group Progress Note    Moiz Mooney  1975 4/23/2025    Notes on Client Progress in Group    Reason for Absence: DNS     ENZO Delarosa  4/23/2025 7:34 AM    Co-Therapist: N/A

## 2025-04-24 ENCOUNTER — HOSPITAL ENCOUNTER (OUTPATIENT)
Dept: PSYCHIATRY | Age: 50
Setting detail: THERAPIES SERIES
Discharge: HOME | End: 2025-04-24

## 2025-04-24 PROCEDURE — 90832 PSYTX W PT 30 MINUTES: CPT

## 2025-04-25 NOTE — PROGRESS NOTES
Lazara ALCANTARA        Individual  Progress Note    Location: [x] [place] [] [place]                   Patient Name: Moiz Mooney   : 1975     Case # :  [id number]  Therapist: AARON Strickland        Did not show       Electronically signed by AARON Strickland on 2025 at 9:56 PM     JULIAN Hi, BRYANNA, AARON

## 2025-04-25 NOTE — PROGRESS NOTES
Lazara ALCANTARA        Individual  Progress Note    Location: [x] [place] [] [place]                   Patient Name: Moiz Mooney   : 1975     Case # :  [id number]  Therapist: AARON Strickland      Case management: see progress report: networking with probation, case management: recommending [facility]        Electronically signed by AARON Strickland on 2025 at 9:25 PM       JULIAN Hi, LSW, AARON

## 2025-04-29 ENCOUNTER — APPOINTMENT (OUTPATIENT)
Dept: PSYCHIATRY | Age: 50
End: 2025-04-29

## 2025-06-23 ENCOUNTER — HOSPITAL ENCOUNTER (OUTPATIENT)
Dept: PSYCHIATRY | Age: 50
Setting detail: THERAPIES SERIES
Discharge: HOME | End: 2025-06-23

## 2025-06-23 PROCEDURE — 80305 DRUG TEST PRSMV DIR OPT OBS: CPT

## 2025-06-23 PROCEDURE — 90834 PSYTX W PT 45 MINUTES: CPT

## 2025-06-24 NOTE — PROGRESS NOTES
[facility]REATMENT PLAN           Location: [x] [place] [] [place]           Treatment plan: Initial          Strengths: mother supportive, seeking employment           Weakness/Limitations: legal stressors, grief and loss, loss of job            Service/Frequency/Duration: Individual Session x 1 time weekly x 90 days,  SOP group x 1 time weekly,x 90 days: Urinalysis one time monthly x 90 days.             Diagnosis:         F10.20 Alcohol Use Disorder      F12.20 Cannabis Use Disorder                Level of Care:  Level I: Individual            Problem: History of Substance use            Goal: Enhance personalized knowledge and insight associated with mood altering substances x 90 days           Objectives:        1) Remind 2 to 6  detrimental consequences in major life areas regarding substance  use in 90 days Evaluation Date: 03/16/25;  Code:           On 2-18-25: Moiz attended session, admitted missing session last week due to dogs killing hi cat: recalled his loss of cat reminded him his loss of favorite dog: admitted he struggles with significant losses: including his job, and father.       04/15/25: Moiz arrived appearance poor, initially denied using, eventually expressed he used crack, expressed unwillingness to pursue residential treatment due to his trying to close on his house.       6-23-25: Moiz arrived, reported discharged from [facility]: arrived April 29, 2025 until June 22: appearance improved, increased weight:report court pending July 17, 2025: residing with mother, still plans to sell home: report home has nothing but negative thoughts.           2) Identify 4 to 8 psychological or physiological benefits /  gratitude’s due to remaining substance free in 90 days: Evaluation Date:   03/16/25 Code:        4-8-25: provided urine screen: Moiz arrived, admit he continues to work through items and pack items in his house: admit he still struggles with detaching from
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
## 2025-06-25 ENCOUNTER — HOSPITAL ENCOUNTER (OUTPATIENT)
Dept: PSYCHIATRY | Age: 50
Setting detail: THERAPIES SERIES
Discharge: HOME | End: 2025-06-25

## 2025-06-25 PROCEDURE — H2020 THER BEHAV SVC, PER DIEM: HCPCS

## 2025-06-25 NOTE — GROUP NOTE
[facility] Therapy Note      6/25/2025    Location:  [place]      Clients Presents: 1787 1834 1811 1744 1788    Clients Absent: 1710    Length of session: 3.0 hours    Group Note: [facility]    Group Type: Co-Ed    New members were welcomed and introduced.  Norms and expectations of group were discussed.    Content: GROUP CHECKED-IN  TOPIC:  \"Autobiography\"  Clients wrote 5-10 autobiography and shared their stories.    PENNIE Loaiza  6/25/2025 1:28 PM    Co-Therapist: N/A      [facility] Individual Group Progress Note    Moiz Mooney  1975 6/25/2025    Notes on Client Progress in Group    Keith is new to the [facility] group. He attended and participated in the group discussion. He shared that he had just completed rehab at [facility] and stated, \"I'm glad to be clean today!\" He reports continued sobriety and wants to work toward being clean one day at a time. He gave appropriate feedback.    PENNIE Loaiza  6/25/2025 1:38 PM    Co-Therapist: N/A

## 2025-06-27 ENCOUNTER — HOSPITAL ENCOUNTER (OUTPATIENT)
Dept: PSYCHIATRY | Age: 50
Setting detail: THERAPIES SERIES
Discharge: HOME | End: 2025-06-27

## 2025-06-27 PROCEDURE — H2020 THER BEHAV SVC, PER DIEM: HCPCS

## 2025-06-27 NOTE — GROUP NOTE
[facility] Therapy Note      6/27/2025    Location:  [place]      Clients Presents: [phone]     Clients Absent: [id number]    Length of session: 3.0 hours    Group Note: IOP    Group Type: Co-Ed    New members were welcomed and introduced.  Norms and expectations of group were discussed.    Content: GROUP  CHECKED-IN  TOPIC:  \"5-Minute Autobiography\"  Clients continued to shared their autobiography and discussed the insight they gained by sharing.    PENNIE Loaiza  6/27/2025 1:30 PM    Co-Therapist: N/A      [facility] Individual Group Progress Note    Moiz Mooney  1975 6/27/2025    Notes on Client Progress in Group    Keith attended and participated in the group discussion. He was attentive, reports continued sobriety and believes he is making progress in his goal in treatment. He offered insight after sharing his story and gave appropriate feedback.    PENNIE Loaiza  6/27/2025 1:36 PM    Co-Therapist: N/A

## 2025-06-30 ENCOUNTER — APPOINTMENT (OUTPATIENT)
Dept: PSYCHIATRY | Age: 50
End: 2025-06-30

## 2025-06-30 ENCOUNTER — HOSPITAL ENCOUNTER (OUTPATIENT)
Dept: PSYCHIATRY | Age: 50
Setting detail: THERAPIES SERIES
Discharge: HOME | End: 2025-06-30

## 2025-06-30 PROCEDURE — H2020 THER BEHAV SVC, PER DIEM: HCPCS

## 2025-07-01 NOTE — GROUP NOTE
[facility] Note      7/1/2025    Location:  [place]      Clients Presents: 1744 1787 1710    Clients Absent: [id number] [id number]    Length of session: 3.0 hours    Group Note: IOP    Group Type: Co-Ed    New members were welcomed and introduced.  Norms and expectations of group were discussed.    Content: GROUP CHECKED-IN  TOPIC:  \"PAWS\" - \"Addiction Discussion\"  Clients reviewed PAWS, a discussion on addiction and completed treatment worksheet where they identified: (People, Places and Things that made their addiction possible along with a plan to get rid of those things).    PENNIE Loaiza  7/1/2025 8:36 AM    Co-Therapist: N/A      [facility] Individual Group Progress Note    Moiz Mooney  1975 7/1/2025    Notes on Client Progress in Group    Keith attended and participated in the group discussion. He reports continued sobriety and is making progress in his recovery. He offered insight in understanding addiction, completed treatment worksheet and gave appropriate feedback.    PENNIE Loaiza  7/1/2025 8:44 AM    Co-Therapist: N/A

## 2025-07-02 ENCOUNTER — HOSPITAL ENCOUNTER (OUTPATIENT)
Dept: PSYCHIATRY | Age: 50
Setting detail: THERAPIES SERIES
Discharge: HOME | End: 2025-07-02

## 2025-07-02 PROCEDURE — H2020 THER BEHAV SVC, PER DIEM: HCPCS

## 2025-07-02 NOTE — GROUP NOTE
[facility] Therapy Note      7/2/2025    Location:  [place]      Clients Presents: 1787 1744 1841 1788    Clients Absent: [id number]    Length of session: 3.0 hours    Group Note: IOP    Group Type: Co-Ed    New members were welcomed and introduced.  Norms and expectations of group were discussed.    Content: GROUP CHECKED-IN  TOPIC:  \"12 Tips to Staying Clean and Sober during the 4th of July\"  Clients read and discussed hand-out on how to survive the 4th of July weekend without using substances.    PENNIE Loaiza  7/2/2025 5:05 PM    Co-Therapist: N/A      [facility] Group Progress Note    Moiz Mooney  1975 7/2/2025    Notes on Client Progress in Group    Keith attended and participated in the group discussion. He was attentive and reports continued sobriety. He offered insight in having strategies to not use over the 4th of July weekend and gave appropriate feedback.    PENNIE Loaiza  7/2/2025 5:10 PM    Co-Therapist: N/A

## 2025-07-07 ENCOUNTER — APPOINTMENT (OUTPATIENT)
Dept: PSYCHIATRY | Age: 50
End: 2025-07-07
Payer: MEDICAID

## 2025-07-07 ENCOUNTER — HOSPITAL ENCOUNTER (OUTPATIENT)
Dept: PSYCHIATRY | Age: 50
Setting detail: THERAPIES SERIES
Discharge: HOME | End: 2025-07-07

## 2025-07-07 PROCEDURE — H2020 THER BEHAV SVC, PER DIEM: HCPCS

## 2025-07-07 PROCEDURE — 80305 DRUG TEST PRSMV DIR OPT OBS: CPT

## 2025-07-09 ENCOUNTER — HOSPITAL ENCOUNTER (OUTPATIENT)
Dept: PSYCHIATRY | Age: 50
Setting detail: THERAPIES SERIES
Discharge: HOME | End: 2025-07-09

## 2025-07-09 NOTE — GROUP NOTE
[facility] Therapy Note      7/9/2025    Location:  [place]      Clients Presents: 1710 1842 1811 1744 1841 1834 1788    Clients Absent: 1811 1787    Length of session: 3.0 hours    Group Note: IOP    Group Type: Co-Ed    New members were welcomed and introduced.  Norms and expectations of group were discussed.    Content: GROUP CHECKED-IN  TOPIC:  \"Abstinence VS Sobriety\"  Clients learned about the difference between abstinence and sobriety. They identified the dangers of abstaining only from alcohol/drugs and that recovery requires more than abstinence.    PENNIE Loaiza  7/9/2025 2:57 PM    Co-Therapist: N/A      [facility] Group Progress Note    Moiz Mooney  1975 7/9/2025    Notes on Client Progress in Group    Keith attended and participated in the group discussion. He was attentive, reports continued sobriety and stated that he is making progress in treatment. He offered insight in understanding the difference between abstinence and sobriety and gave appropriate feedback.    PENNIE Loaiza  7/9/2025 3:09 PM    Co-Therapist: N/A

## 2025-07-11 ENCOUNTER — HOSPITAL ENCOUNTER (OUTPATIENT)
Dept: PSYCHIATRY | Age: 50
Setting detail: THERAPIES SERIES
Discharge: HOME | End: 2025-07-11

## 2025-07-11 NOTE — GROUP NOTE
[facility] Therapy Note      7/11/2025    Location:  [place]      Clients Presents: 1811 1834 1788 1845 1710    Clients Absent: [id number] [id number]    Length of session: 3.0 hours    Group Note: IOP    Group Type: Co-Ed    New members were welcomed and introduced.  Norms and expectations of group were discussed.    Content: GROUP CHECKED-IN  TOPIC:  \"Truthfulness\"  Clients learned that although truthfulness is not always easy, it is integral to successful recovery. Clients also discussed the consequences and benefits of always telling the truth.    PENNIE Loaiza  7/11/2025 12:46 PM    Co-Therapist: N/A      [facility] Group Progress Note    Moiz Mooney  1975 7/11/2025    Notes on Client Progress in Group    Reason for Absence: CX-Client reports having to attend court.      PENNIE Loaiza  7/11/2025 1:10 PM    Co-Therapist: N/A

## 2025-07-14 ENCOUNTER — HOSPITAL ENCOUNTER (OUTPATIENT)
Dept: PSYCHIATRY | Age: 50
Setting detail: THERAPIES SERIES
Discharge: HOME | End: 2025-07-14

## 2025-07-14 ENCOUNTER — APPOINTMENT (OUTPATIENT)
Dept: PSYCHIATRY | Age: 50
End: 2025-07-14
Payer: MEDICAID

## 2025-07-14 PROCEDURE — H2020 THER BEHAV SVC, PER DIEM: HCPCS

## 2025-07-14 NOTE — GROUP NOTE
[facility] Note      7/14/2025    Location:  [place]      Clients Presents: 1787 1744 1811 1841 1842 1710    Clients Absent: [phone]    Length of session: 3.0 hours    Group Note: IOP    Group Type: Co-Ed    New members were welcomed and introduced.  Norms and expectations of group were discussed.    Content: GROUP CHECKED-IN  TOPIC:  \"Adverse Childhood Experience(s)-ACEs\" - \"Trauma and Addiction\"  Clients completed the ACE's quiz and shared their findings. Clients discussed the connection between trauma and addiction    Elizabeth Farias [facility] Hospital[facility]LES  7/14/2025 12:38 PM    Co-Therapist: N/A      [facility] Individual Group Progress Note    Moiz Mooney  1975 7/14/2025    Notes on Client Progress in Group    Keith attended and participated in the group discussion. He was attentive, reports continued sobriety and shared about the contention between him and his sister. He stated that he is making progress and has some up coming court dates that he has to deal with. He offered insight in understanding the ACEs quiz and how trauma and addiction usually go together. He gave appropriate feedback.    PENNIE Loaiza  7/14/2025 12:58 PM    Co-Therapist: N/A

## 2025-07-16 ENCOUNTER — HOSPITAL ENCOUNTER (OUTPATIENT)
Dept: PSYCHIATRY | Age: 50
Setting detail: THERAPIES SERIES
Discharge: HOME | End: 2025-07-16

## 2025-07-16 NOTE — GROUP NOTE
[facility] Note      7/16/2025    Location:  [place]      Clients Presents: 1787 1710 1811 1841 1834 1842    Clients Absent: [id number]    Length of session: 3.0 hours    Group Note: IOP    Group Type: Co-Ed    New members were welcomed and introduced.  Norms and expectations of group were discussed.    Content: GROUP CHECKED-IN  TOPIC:  \"Wants VS. Needs In Recovery-Maslow's Hierarchy of Needs\"  Clients learned the difference between wants and needs. Clients discussed Maslow's Hierarchy Level of Needs and how substance abuse/addictions influence each of the levels.    PENNIE Loaiza  7/16/2025 12:39 PM    Co-Therapist: N/A      [facility] Group Progress Note    Moiz Mooney  1975 7/16/2025    Notes on Client Progress in Group    Reason for Absence: CX--Client cancelled due to appearing in court.     PENNIE Loaiza  7/16/2025 1:10 PM    Co-Therapist: N/A

## 2025-07-18 ENCOUNTER — HOSPITAL ENCOUNTER (OUTPATIENT)
Dept: PSYCHIATRY | Age: 50
Setting detail: THERAPIES SERIES
Discharge: HOME | End: 2025-07-18

## 2025-07-18 PROCEDURE — H2020 THER BEHAV SVC, PER DIEM: HCPCS

## 2025-07-18 NOTE — GROUP NOTE
[facility] Group Therapy Note      7/18/2025    Location:  [place]      Clients Presents: [phone]    Clients Absent: [phone]    Length of session: 3.0 hours    Group Note: IOP    Group Type: Co-Ed    New members were welcomed and introduced.  Norms and expectations of group were discussed.    Content: GROUP CHECKED-IN  TOPIC:  \"How Anger Affects Your Health\" - \"Identifying Triggers\"  Clients learned how anger causes high levels of stress, how to identify those triggers and strategies to manage them (i.e. relaxation techniques, deep breathing, walking away, taking a time-out)    DAVIS LoaizaMI  7/18/2025 1:20 PM    Co-Therapist: N/A      [facility] Individual Group Progress Note    Moiz Mooney  1975 7/18/2025    Notes on Client Progress in Group    Keith attended and participated in the group discussion. He was attentive, reports that he is making progress and continued sobriety. He stated that his biggest stressor is continued court obligations and is seeking ILC. He offered insight in identifying his anger, strategies to manage anger, completed treatment worksheet and gave appropriate feedback.     PENNIE Loaiza  7/18/2025 2:02 PM    Co-Therapist: N/A

## 2025-07-21 ENCOUNTER — APPOINTMENT (OUTPATIENT)
Dept: PSYCHIATRY | Age: 50
End: 2025-07-21
Payer: MEDICAID

## 2025-07-21 ENCOUNTER — HOSPITAL ENCOUNTER (OUTPATIENT)
Dept: PSYCHIATRY | Age: 50
Setting detail: THERAPIES SERIES
Discharge: HOME | End: 2025-07-21
Payer: MEDICAID

## 2025-07-21 PROCEDURE — H2020 THER BEHAV SVC, PER DIEM: HCPCS

## 2025-07-21 NOTE — GROUP NOTE
[facility] Group Therapy Note      7/21/2025    Location:  [place]      Clients Presents: [phone]    Clients Absent: [phone]    Length of session: 3.0 hours    Group Note: IOP    Group Type: Co-Ed    New members were welcomed and introduced.  Norms and expectations of group were discussed.    Content: GROUP CHECKED-IN  TOPIC:  \"6 Ways to Understand The Changes in Addiction and Treatment Recovery\"  Clients reviewed handout where they discussed the stages of change in addiction recovery and identified which stage they were currently in.     PENNIE Loaiza  7/21/2025 1:54 PM    Co-Therapist: N/A      [facility] Individual Group Progress Note    Moiz Mooney  1975 7/21/2025    Notes on Client Progress in Group    Keith attended and participated in the group discussion. He reports that he is making progress in his recovery. He voiced being stressed  about the up and coming court dates and reports continued sobriety. He offered insight in understanding the stages of change and believes that he is the action stage. He gave appropriate feedback.    PENNIE Loaiza  7/21/2025 2:13 PM    Co-Therapist: N/A

## 2025-07-23 ENCOUNTER — HOSPITAL ENCOUNTER (OUTPATIENT)
Dept: PSYCHIATRY | Age: 50
Setting detail: THERAPIES SERIES
Discharge: HOME | End: 2025-07-23
Payer: MEDICAID

## 2025-07-23 PROCEDURE — H2020 THER BEHAV SVC, PER DIEM: HCPCS

## 2025-07-23 NOTE — GROUP NOTE
[facility] Group Therapy Note      7/23/2025    Location:  [place]      Clients Presents: [phone]    Clients Absent: [phone]    Length of session: 3.0 hours    Group Note: IOP    Group Type: Co-Ed    New members were welcomed and introduced.  Norms and expectations of group were discussed.    Content: GROUP CHECKED-IN  TOPIC:  \"Lasting Effects of Drug/Alcohol Use\" - \"My Top 5 Reasons for Change\"  Clients examined pros and cons of drinking/using drugs. Clients shared reasons they want to change and what motivates them to change.    Elizabeth Farias [facility]  7/23/2025 4:27 PM    Co-Therapist: N/A      [facility] Individual Group Progress Note    Moiz Mooney  1975 7/23/2025    Notes on Client Progress in Group    Keith attended and participated in the group discussion. He reports that he is making progress and has continued sobriety. He reports stressing over his court dates that are coming up. He offered insight in understanding the pros and cons of using substances and gave appropriate feedback.    DAVIS LoaizaMN  7/23/2025 4:42 PM    Co-Therapist: N/A

## 2025-07-25 ENCOUNTER — HOSPITAL ENCOUNTER (OUTPATIENT)
Dept: PSYCHIATRY | Age: 50
Setting detail: THERAPIES SERIES
Discharge: HOME | End: 2025-07-25
Payer: MEDICAID

## 2025-07-25 PROCEDURE — H2020 THER BEHAV SVC, PER DIEM: HCPCS

## 2025-07-25 NOTE — GROUP NOTE
[facility] Group Therapy Note      7/25/2025    Location:  [place]      Clients Presents: [phone]    Clients Absent: [phone]    Length of session: 3.0 hours    Group Note: [facility]    Group Type: Co-Ed    New members were welcomed and introduced.  Norms and expectations of group were discussed.    Content: GROUP CHECKED-IN  TOPIC:  \"Recovery Ball\"  Clients learned that recovery is not a test of will but of commitment and smart planning. Clients played the recovery ball game to test their knowledge about addiction.    PENNIE Loaiza  7/25/2025 1:38 PM    Co-Therapist: N/A      [facility] Individual Group Progress Note    Moiz Mooney  1975 7/25/2025    Notes on Client Progress in Group    Keith attended and participated in the group discussion. He reports making progress in treatment and reports continued sobriety. He offered insight in being committed to his recovery, he has a knowledge about addiction and gave appropriate feedback.    PENNIE Loaiza  7/25/2025 1:53 PM    Co-Therapist: N/A

## 2025-07-28 ENCOUNTER — APPOINTMENT (OUTPATIENT)
Dept: PSYCHIATRY | Age: 50
End: 2025-07-28
Payer: MEDICAID

## 2025-07-28 ENCOUNTER — HOSPITAL ENCOUNTER (OUTPATIENT)
Dept: PSYCHIATRY | Age: 50
Setting detail: THERAPIES SERIES
Discharge: HOME | End: 2025-07-28
Payer: MEDICAID

## 2025-07-28 ENCOUNTER — HOSPITAL ENCOUNTER (OUTPATIENT)
Dept: PSYCHIATRY | Age: 50
Setting detail: THERAPIES SERIES
End: 2025-07-28
Payer: MEDICAID

## 2025-07-28 PROCEDURE — H2020 THER BEHAV SVC, PER DIEM: HCPCS

## 2025-07-28 NOTE — GROUP NOTE
[facility] Group Therapy Note      7/28/2025    Location:  [facility]    Clients Presents: [phone]    Clients Absent: 0    Length of session: 3.0 hours    Group Note: IOP    Group Type: Co-Ed    New members were welcomed and introduced.  Norms and expectations of group were discussed.    Content: GROUP CHECKED-IN  TOPIC:  \"Just For Today Reading on Intimacy\" - \"Serenity Prayer\"  Clients discussed the meaning of intimacy and how it relates to recovery. Clients learned to distinguish between things that can be changed and those that cannot. Clients discussed things in their lives that they will change.    DAVIS LoaizaID  7/28/2025 12:31 PM    Co-Therapist: N/A      [facility] Group Progress Note    Moiz Mooney  1975 7/28/2025    Notes on Client Progress in Group    Keith attended and participated in the group discussion. He reports that he is making progress toward his goal in treatment and reports continued sobriety. He offered insight in the group discussion of intimacy as it relates to recovery. He completed treatment worksheet and gave appropriate feedback.    PENNIE Loaiza  7/28/2025 12:55 PM    Co-Therapist: N/A

## 2025-07-28 NOTE — PROGRESS NOTES
[facility] TREATMENT PLAN      Location: [x] [place] [] [place]    Treatment plan: Extended Care    Strengths: Personable, Dedication, Hard Worker, Intelligent    Weakness/Limitations: Impatience    Service/Frequency/Duration: Case Management as needed    Diagnosis: F10.20 Other and unspecified alcohol dependence/unspecified drinking behavior    Level of Care: 2.1 Intensive Outpatient Services    Problem: Client lacks food resources   Goal: Apply for SNAP x 90 days    Objectives:   1) Complete application for SNAP x 90 days Evaluation Date: 10/28/25 Code: A Achieved Date Achieved: 7/28/2025  Completed application on 7/28/25  2) Provide letter to [facility] describing living arrangements and lack of income x 90 days Evaluation Date: 10/28/25 Code: A Achieved Date Achieved: 7/28/2025 Provided letter and lack of income on 7/28/25    2.    Problem: Client lacks educational resources   Goal: Connect client with educational resources x 90 days   Objectives:   1) Identify 1 option for Bachelors Degree in Chemical Dependency Evaluation Date: 10/28/25 Code: A Achieved Date Achieved: 7/28/2025 Provided client with information on [facility]  2) Provide client with FAFSA information Evaluation Date: 10/28/25 Code: A Achieved Date Achieved: 7/28/2025 Provided client with information to complete FAFSA online on 7/28/25    3.    Problem: Client lacks resource for Social Security Benefits   Goal: Connect Client to Social Security Benefits x 90 days   Objectives:   1) Provide client with 3 ways to apply for Social Security Benefits Evaluation Date: 10/28/25 Code: A Achieved Date Achieved: 7/28/2025 Provided client with online, in person and mail option to apply for Social Security Benefits.      Defer: Client would like to further his education.      Discharge Plan/Instructions: Follow through with case management recommendations.      Moiz Mooney / 1975 has participated in the treatment plan

## 2025-07-30 ENCOUNTER — HOSPITAL ENCOUNTER (OUTPATIENT)
Dept: PSYCHIATRY | Age: 50
Setting detail: THERAPIES SERIES
Discharge: HOME | End: 2025-07-30
Payer: MEDICAID

## 2025-07-30 PROCEDURE — H2020 THER BEHAV SVC, PER DIEM: HCPCS

## 2025-07-30 PROCEDURE — 90837 PSYTX W PT 60 MINUTES: CPT

## 2025-07-30 NOTE — GROUP NOTE
[facility] Group Therapy Note      7/30/2025    Location:  [place]      Clients Presents: [phone]    Clients Absent: [phone]    Length of session: 3.0 hours    Group Note: IOP    Group Type: Co-Ed    New members were welcomed and introduced.  Norms and expectations of group were discussed.    Content: GROUP CHECKED-IN  TOPIC:  \"How to Set Goals the SMART Way\"  Clients learned about the acronym SMART in setting goals. Clients learned how addiction recovery goals are measurable and attainable steps that take them closer to their lifelong goal of sobriety and well-being.    PENNIE Loaiza  7/30/2025 3:14 PM    Co-Therapist: N/A      [facility] Individual Group Progress Note    Moiz Mooney  1975 7/30/2025    Notes on Client Progress in Group    Keith attended and participated in the group discussion. He reports that he is making progress toward his goal of continue sobriety. He offered insight in the group discussion on SMART goals in recovery and gave appropriate feedback.     PENNIE Loaiza  7/30/2025 3:20 PM    Co-Therapist: N/A

## 2025-07-30 NOTE — PROGRESS NOTES
[facility]                Progress Note    [x] [place]  [] [place]                    Patient Name: Moiz Mooney   : 1975     Case # :  [id number]  Therapist: PENNIE Loaiza        Objective/Service/Time:    .60  MINUTES    S:  Client attended his first individual session with this therapist. He shared more about his recovery journey and how loss and grief played a role in him spiraling out of control. He shared that he had loss two of his dogs which was his family to drowning accidents, a cat and his father in a matter of months.He voiced concerns about court dates for 23 and .    O:  Client was engaged and oriented 4x.     A:  Client reports having support of his mother and having a very small [place] of support.    P:  Client will continue in treatment.                  Elizabeth Farias MA, PENNIE, 25, 3:27 PM

## 2025-07-31 NOTE — PROGRESS NOTES
[facility] TREATMENT PLAN      Location: [x] [place] [] [place]    Treatment plan:  UPDATE  1384   (7/30/25)    Strengths: Smart, Athletic, Open    Weakness/Limitations: Loss/Grief    Service/Frequency/Duration: Individual TBD for 90 days, IOP 3 times a week for 90 days, Urinalysis random monthly for 90 days, AA/NA 1-2 weekly for 90 days and Case Management as needed for 90 days    Diagnosis: F10.20 Alcohol Use Disorder, F12.20 Cannabis Use Disorder     Level of Care: 2.1 Intensive Outpatient Services    Problem: Client reports completing residential and needed continued AOD support  Goal: Abstain from using alcohol in 90 Days   Objectives:   1) Verbalize 3 Indicators of Addiction in 90 Days Evaluation Date: 9/25/25   Code: A  Achieved  6/25/25 Loss of Control, Need and Compulsion, Continued Use Despite Adverse Consequences   2) Verbalize 3 Negative Effects of Substance Use in 90 Days Evaluation Date: 9/25/25  Code: A  Achieved  6/25/25 Mental Health, Legal, Self-loathing  3) Verbalize3 Ways Staying Clean could positively impact your life in 90 Days Evaluation Date: 9/25/25  Code: C Continue TBD    2.    Problem: Lacks Coping Skills to Maintain Long-term Sobriety   Goal: Acquire necessary skills to maintain sobriety in 90 Days   Objectives:   1) Identify 3 Changes that you will make that support your recovery in 90 Days Evaluation Date: 9/25/25   Code: C Continue TBD   2) Identify 3 External/Internal Triggers and Sober Responses to them in 90 Days Evaluation Date: 9/25/25   Code: C Continue TBD   3) 1x Month contact with [facility]  for support in 90 Days Evaluation Date:  9/25/25   Code: C Continue TBD     3.    Problem: Lacks Understanding and Knowledge of Addiction   Goal: Develop increased awareness of the Disease of Addiction and Relapse triggers and coping strategies needed to effectively deal with them that support long-term sobriety in 90 Days  Objectives:   1) Identify 3 Ways to achieve a quality  none

## 2025-08-01 ENCOUNTER — HOSPITAL ENCOUNTER (OUTPATIENT)
Dept: PSYCHIATRY | Age: 50
Setting detail: THERAPIES SERIES
Discharge: HOME | End: 2025-08-01
Payer: MEDICAID

## 2025-08-01 PROCEDURE — H2020 THER BEHAV SVC, PER DIEM: HCPCS

## 2025-08-01 NOTE — GROUP NOTE
[facility] Group Therapy Note      8/1/2025    Location:  [place]      Clients Presents: [phone]     Clients Absent: [phone]    Length of session: 3.0 hours    Group Note: IOP    Group Type: Co-Ed    New members were welcomed and introduced.  Norms and expectations of group were discussed.    Content: GROUP CHECKED-IN  TOPIC:  \"Consequences of Addiction\"  Clients explored how their addiction generated consequence:  physically, emotionally, spiritually and cognitively.    PENNIE Loaiza  8/1/2025 12:54 PM    Co-Therapist: N/A      [facility] Individual Group Progress Note    Moiz Mooney  1975 8/1/2025    Notes on Client Progress in Group    Keith attended and participated in the group discussion. He stated that he is making progress in his goal of healthier living. He offered insight in understanding the consequences of addiction and how it showed up in his life. He completed treatment worksheet and gave appropriate feedback.    PENNIE Loaiza  8/1/2025 1:03 PM    Co-Therapist: N/A

## 2025-08-04 ENCOUNTER — HOSPITAL ENCOUNTER (OUTPATIENT)
Dept: PSYCHIATRY | Age: 50
Setting detail: THERAPIES SERIES
Discharge: HOME | End: 2025-08-04
Payer: MEDICAID

## 2025-08-04 ENCOUNTER — APPOINTMENT (OUTPATIENT)
Dept: PSYCHIATRY | Age: 50
End: 2025-08-04
Payer: MEDICAID

## 2025-08-04 PROCEDURE — H2020 THER BEHAV SVC, PER DIEM: HCPCS

## 2025-08-04 PROCEDURE — 99484 CARE MGMT SVC BHVL HLTH COND: CPT

## 2025-08-06 ENCOUNTER — HOSPITAL ENCOUNTER (OUTPATIENT)
Dept: PSYCHIATRY | Age: 50
Setting detail: THERAPIES SERIES
Discharge: HOME | End: 2025-08-06
Payer: MEDICAID

## 2025-08-06 PROCEDURE — 90834 PSYTX W PT 45 MINUTES: CPT

## 2025-08-06 PROCEDURE — H2020 THER BEHAV SVC, PER DIEM: HCPCS

## 2025-08-08 ENCOUNTER — HOSPITAL ENCOUNTER (OUTPATIENT)
Dept: PSYCHIATRY | Age: 50
Setting detail: THERAPIES SERIES
Discharge: HOME | End: 2025-08-08
Payer: MEDICAID

## 2025-08-08 PROCEDURE — H2020 THER BEHAV SVC, PER DIEM: HCPCS

## 2025-08-11 ENCOUNTER — APPOINTMENT (OUTPATIENT)
Dept: PSYCHIATRY | Age: 50
End: 2025-08-11
Payer: MEDICAID

## 2025-08-11 ENCOUNTER — HOSPITAL ENCOUNTER (OUTPATIENT)
Dept: PSYCHIATRY | Age: 50
Setting detail: THERAPIES SERIES
Discharge: HOME | End: 2025-08-11
Payer: MEDICAID

## 2025-08-11 PROCEDURE — H2020 THER BEHAV SVC, PER DIEM: HCPCS

## 2025-08-11 PROCEDURE — 80305 DRUG TEST PRSMV DIR OPT OBS: CPT

## 2025-08-13 ENCOUNTER — HOSPITAL ENCOUNTER (OUTPATIENT)
Dept: PSYCHIATRY | Age: 50
Setting detail: THERAPIES SERIES
Discharge: HOME | End: 2025-08-13
Payer: MEDICAID

## 2025-08-13 PROCEDURE — H2020 THER BEHAV SVC, PER DIEM: HCPCS

## 2025-08-13 PROCEDURE — 90834 PSYTX W PT 45 MINUTES: CPT

## 2025-08-15 ENCOUNTER — HOSPITAL ENCOUNTER (OUTPATIENT)
Dept: PSYCHIATRY | Age: 50
Setting detail: THERAPIES SERIES
Discharge: HOME | End: 2025-08-15
Payer: MEDICAID

## 2025-08-15 PROCEDURE — H2020 THER BEHAV SVC, PER DIEM: HCPCS

## 2025-08-18 ENCOUNTER — HOSPITAL ENCOUNTER (OUTPATIENT)
Dept: PSYCHIATRY | Age: 50
Setting detail: THERAPIES SERIES
Discharge: HOME | End: 2025-08-18
Payer: MEDICAID

## 2025-08-18 ENCOUNTER — APPOINTMENT (OUTPATIENT)
Dept: PSYCHIATRY | Age: 50
End: 2025-08-18
Payer: MEDICAID

## 2025-08-18 PROCEDURE — H2020 THER BEHAV SVC, PER DIEM: HCPCS

## 2025-08-20 ENCOUNTER — HOSPITAL ENCOUNTER (OUTPATIENT)
Dept: PSYCHIATRY | Age: 50
Setting detail: THERAPIES SERIES
Discharge: HOME | End: 2025-08-20
Payer: MEDICAID

## 2025-08-20 PROCEDURE — H2020 THER BEHAV SVC, PER DIEM: HCPCS

## 2025-08-22 ENCOUNTER — HOSPITAL ENCOUNTER (OUTPATIENT)
Dept: PSYCHIATRY | Age: 50
Setting detail: THERAPIES SERIES
Discharge: HOME | End: 2025-08-22
Payer: MEDICAID

## 2025-08-22 PROCEDURE — H2020 THER BEHAV SVC, PER DIEM: HCPCS

## 2025-08-25 ENCOUNTER — APPOINTMENT (OUTPATIENT)
Dept: PSYCHIATRY | Age: 50
End: 2025-08-25
Payer: MEDICAID

## 2025-08-25 ENCOUNTER — HOSPITAL ENCOUNTER (OUTPATIENT)
Dept: PSYCHIATRY | Age: 50
Setting detail: THERAPIES SERIES
Discharge: HOME | End: 2025-08-25
Payer: MEDICAID

## 2025-08-25 PROCEDURE — H2020 THER BEHAV SVC, PER DIEM: HCPCS

## 2025-08-27 ENCOUNTER — HOSPITAL ENCOUNTER (OUTPATIENT)
Dept: PSYCHIATRY | Age: 50
Setting detail: THERAPIES SERIES
Discharge: HOME | End: 2025-08-27
Payer: MEDICAID

## 2025-08-27 PROCEDURE — H2020 THER BEHAV SVC, PER DIEM: HCPCS

## 2025-08-29 ENCOUNTER — HOSPITAL ENCOUNTER (OUTPATIENT)
Dept: PSYCHIATRY | Age: 50
Setting detail: THERAPIES SERIES
Discharge: HOME | End: 2025-08-29
Payer: MEDICAID

## 2025-08-29 PROCEDURE — H2020 THER BEHAV SVC, PER DIEM: HCPCS

## 2025-09-01 ENCOUNTER — APPOINTMENT (OUTPATIENT)
Dept: PSYCHIATRY | Age: 50
End: 2025-09-01
Payer: MEDICAID

## 2025-09-03 ENCOUNTER — HOSPITAL ENCOUNTER (OUTPATIENT)
Dept: PSYCHIATRY | Age: 50
Setting detail: THERAPIES SERIES
Discharge: HOME | End: 2025-09-03
Payer: MEDICAID

## 2025-09-03 PROCEDURE — H2020 THER BEHAV SVC, PER DIEM: HCPCS

## 2025-09-05 ENCOUNTER — HOSPITAL ENCOUNTER (OUTPATIENT)
Dept: PSYCHIATRY | Age: 50
Setting detail: THERAPIES SERIES
End: 2025-09-05
Payer: MEDICAID

## 2025-09-05 ENCOUNTER — HOSPITAL ENCOUNTER (OUTPATIENT)
Dept: PSYCHIATRY | Age: 50
Setting detail: THERAPIES SERIES
Discharge: HOME | End: 2025-09-05
Payer: MEDICAID

## 2025-09-08 ENCOUNTER — APPOINTMENT (OUTPATIENT)
Dept: PSYCHIATRY | Age: 50
End: 2025-09-08
Payer: MEDICAID

## 2025-09-10 ENCOUNTER — APPOINTMENT (OUTPATIENT)
Dept: PSYCHIATRY | Age: 50
End: 2025-09-10
Payer: MEDICAID

## 2025-09-12 ENCOUNTER — APPOINTMENT (OUTPATIENT)
Dept: PSYCHIATRY | Age: 50
End: 2025-09-12
Payer: MEDICAID

## 2025-09-15 ENCOUNTER — APPOINTMENT (OUTPATIENT)
Dept: PSYCHIATRY | Age: 50
End: 2025-09-15
Payer: MEDICAID

## 2025-09-17 ENCOUNTER — APPOINTMENT (OUTPATIENT)
Dept: PSYCHIATRY | Age: 50
End: 2025-09-17
Payer: MEDICAID

## 2025-09-19 ENCOUNTER — APPOINTMENT (OUTPATIENT)
Dept: PSYCHIATRY | Age: 50
End: 2025-09-19
Payer: MEDICAID

## 2025-09-22 ENCOUNTER — APPOINTMENT (OUTPATIENT)
Dept: PSYCHIATRY | Age: 50
End: 2025-09-22
Payer: MEDICAID

## 2025-09-24 ENCOUNTER — APPOINTMENT (OUTPATIENT)
Dept: PSYCHIATRY | Age: 50
End: 2025-09-24
Payer: MEDICAID

## 2025-09-26 ENCOUNTER — APPOINTMENT (OUTPATIENT)
Dept: PSYCHIATRY | Age: 50
End: 2025-09-26
Payer: MEDICAID

## 2025-09-29 ENCOUNTER — APPOINTMENT (OUTPATIENT)
Dept: PSYCHIATRY | Age: 50
End: 2025-09-29
Payer: MEDICAID